# Patient Record
Sex: MALE | Race: BLACK OR AFRICAN AMERICAN | URBAN - METROPOLITAN AREA
[De-identification: names, ages, dates, MRNs, and addresses within clinical notes are randomized per-mention and may not be internally consistent; named-entity substitution may affect disease eponyms.]

---

## 2018-03-22 ENCOUNTER — HISTORICAL (OUTPATIENT)
Dept: ADMINISTRATIVE | Facility: HOSPITAL | Age: 58
End: 2018-03-22

## 2018-03-22 LAB
ABS NEUT (OLG): 2.15 X10(3)/MCL
ALBUMIN SERPL-MCNC: 2.9 GM/DL (ref 3.4–5)
ALBUMIN/GLOB SERPL: 0 RATIO (ref 1–2)
ALP SERPL-CCNC: 33 UNIT/L (ref 45–117)
ALT SERPL-CCNC: 30 UNIT/L (ref 12–78)
AMPHET UR QL SCN: NEGATIVE
APPEARANCE, UA: CLEAR
AST SERPL-CCNC: 46 UNIT/L (ref 15–37)
BACTERIA #/AREA URNS AUTO: ABNORMAL /[HPF]
BARBITURATE SCN PRESENT UR: NEGATIVE
BASOPHILS NFR BLD MANUAL: 0 %
BENZODIAZ UR QL SCN: NEGATIVE
BILIRUB SERPL-MCNC: 0.4 MG/DL (ref 0.2–1)
BILIRUB UR QL STRIP: NEGATIVE
BILIRUBIN DIRECT+TOT PNL SERPL-MCNC: 0.2 MG/DL
BILIRUBIN DIRECT+TOT PNL SERPL-MCNC: 0.2 MG/DL
BUN SERPL-MCNC: 22 MG/DL (ref 7–18)
CALCIUM SERPL-MCNC: 8.9 MG/DL (ref 8.5–10.1)
CANNABINOIDS UR QL SCN: NEGATIVE
CD3+CD4+ CELLS # SPEC: 228 UNIT/L (ref 589–1505)
CD3+CD4+ CELLS NFR BLD: 9.2 % (ref 31–59)
CHLORIDE SERPL-SCNC: 100 MMOL/L (ref 98–107)
CHOLEST SERPL-MCNC: 102 MG/DL
CHOLEST/HDLC SERPL: 2.5 {RATIO} (ref 0–5)
CO2 SERPL-SCNC: 26 MMOL/L (ref 21–32)
COCAINE UR QL SCN: NEGATIVE
COLOR UR: YELLOW
CREAT SERPL-MCNC: 1.1 MG/DL (ref 0.6–1.3)
DEPRECATED CALCIDIOL+CALCIFEROL SERPL-MC: 18.58 NG/ML (ref 30–80)
EOSINOPHIL NFR BLD MANUAL: 1 %
ERYTHROCYTE [DISTWIDTH] IN BLOOD BY AUTOMATED COUNT: 13.7 % (ref 11.5–14.5)
GLOBULIN SER-MCNC: 6.2 GM/ML (ref 2.3–3.5)
GLUCOSE (UA): NORMAL
GLUCOSE SERPL-MCNC: 71 MG/DL (ref 74–106)
GRANULOCYTES NFR BLD MANUAL: 43 % (ref 43–75)
HAV AB SER QL IA: REACTIVE
HBV CORE AB SERPL QL IA: NONREACTIVE
HBV SURFACE AB SER-ACNC: <3.1 MIU/ML
HBV SURFACE AB SERPL IA-ACNC: NONREACTIVE M[IU]/ML
HBV SURFACE AG SERPL QL IA: NEGATIVE
HCT VFR BLD AUTO: 35.4 % (ref 40–51)
HCV AB SERPL QL IA: NONREACTIVE
HDLC SERPL-MCNC: 41 MG/DL
HGB BLD-MCNC: 11.4 GM/DL (ref 13.5–17.5)
HGB UR QL STRIP: NEGATIVE
HYALINE CASTS #/AREA URNS LPF: ABNORMAL /[LPF]
KETONES UR QL STRIP: NEGATIVE
LDLC SERPL CALC-MCNC: 45 MG/DL (ref 0–130)
LEUKOCYTE ESTERASE UR QL STRIP: 25 LEU/UL
LYMPHOCYTES # BLD AUTO: 2475 UNIT/L (ref 1260–5520)
LYMPHOCYTES NFR BLD MANUAL: 2 %
LYMPHOCYTES NFR BLD MANUAL: 45 % (ref 20.5–51.1)
LYMPHOCYTES NFR LN MANUAL: 45 % (ref 28–48)
LYMPHOMA - T-CELL MARKERS SPEC-IMP: ABNORMAL
MCH RBC QN AUTO: 29.2 PG (ref 26–34)
MCHC RBC AUTO-ENTMCNC: 32.2 GM/DL (ref 31–37)
MCV RBC AUTO: 90.5 FL (ref 80–100)
MONOCYTES NFR BLD MANUAL: 6 % (ref 2–9)
NEG CONT SPOT COUNT: NORMAL
NEUTS BAND NFR BLD MANUAL: 3 % (ref 0–10)
NITRITE UR QL STRIP: NEGATIVE
OPIATES UR QL SCN: NEGATIVE
PANEL A SPOT COUNT: 0
PANEL B SPOT COUNT: 0
PCP UR QL: NEGATIVE
PH UR STRIP.AUTO: 7 [PH] (ref 5–8)
PH UR STRIP: 7 [PH] (ref 4.5–8)
PLATELET # BLD AUTO: 96 X10(3)/MCL (ref 130–400)
PLATELET # BLD EST: ABNORMAL 10*3/UL
PMV BLD AUTO: 9.9 FL (ref 7.4–10.4)
POS CONT SPOT COUNT: NORMAL
POTASSIUM SERPL-SCNC: 4.1 MMOL/L (ref 3.5–5.1)
PROT SERPL-MCNC: 9.1 GM/DL (ref 6.4–8.2)
PROT UR QL STRIP: NEGATIVE
PSA SERPL-MCNC: 4.6 NG/ML
RBC # BLD AUTO: 3.91 X10(6)/MCL (ref 4.5–5.9)
RBC #/AREA URNS AUTO: ABNORMAL /[HPF]
RBC MORPH BLD: NORMAL
SODIUM SERPL-SCNC: 134 MMOL/L (ref 136–145)
SP GR UR STRIP: 1.01 (ref 1–1.03)
SQUAMOUS #/AREA URNS LPF: ABNORMAL /[LPF]
T PALLIDUM AB SER QL: NONREACTIVE
T-SPOT.TB: NEGATIVE
T4 FREE SERPL-MCNC: 1.06 NG/DL (ref 0.76–1.46)
TEMPERATURE, URINE (OHS): 23 DEGC (ref 20–25)
TRIGL SERPL-MCNC: 78 MG/DL
TSH SERPL-ACNC: 8.58 MIU/L (ref 0.36–3.74)
UROBILINOGEN UR STRIP-ACNC: 2 MG/DL
VLDLC SERPL CALC-MCNC: 16 MG/DL
WBC # BLD AUTO: 5500 /MM3 (ref 4500–11500)
WBC # SPEC AUTO: 5.5 X10(3)/MCL (ref 4.5–11)
WBC #/AREA URNS AUTO: ABNORMAL /HPF

## 2018-05-16 LAB
COLOR STL: NORMAL
CONSISTENCY STL: NORMAL
HEMOCCULT SP1 STL QL: NEGATIVE
HEMOCCULT SP2 STL QL: NEGATIVE

## 2018-05-25 ENCOUNTER — HISTORICAL (OUTPATIENT)
Dept: ADMINISTRATIVE | Facility: HOSPITAL | Age: 58
End: 2018-05-25

## 2019-03-15 ENCOUNTER — HISTORICAL (OUTPATIENT)
Dept: ADMINISTRATIVE | Facility: HOSPITAL | Age: 59
End: 2019-03-15

## 2019-03-15 LAB
ABS NEUT (OLG): 0.58 X10(3)/MCL
ALBUMIN SERPL-MCNC: 3 GM/DL (ref 3.4–5)
ALBUMIN/GLOB SERPL: 0.6 RATIO (ref 1.1–2)
ALP SERPL-CCNC: 38 UNIT/L (ref 45–117)
ALT SERPL-CCNC: 31 UNIT/L (ref 12–78)
ANISOCYTOSIS BLD QL SMEAR: ABNORMAL
APPEARANCE, UA: CLEAR
AST SERPL-CCNC: 51 UNIT/L (ref 15–37)
BACTERIA #/AREA URNS AUTO: ABNORMAL /[HPF]
BASOPHILS NFR BLD MANUAL: 1 %
BILIRUB SERPL-MCNC: 0.4 MG/DL (ref 0.2–1)
BILIRUB UR QL STRIP: NEGATIVE
BILIRUBIN DIRECT+TOT PNL SERPL-MCNC: 0.1 MG/DL
BILIRUBIN DIRECT+TOT PNL SERPL-MCNC: 0.3 MG/DL
BUN SERPL-MCNC: 8 MG/DL (ref 7–18)
CALCIUM SERPL-MCNC: 8.6 MG/DL (ref 8.5–10.1)
CD3+CD4+ CELLS # SPEC: 69 UNIT/L (ref 589–1505)
CD3+CD4+ CELLS NFR BLD: 7.2 % (ref 31–59)
CHLORIDE SERPL-SCNC: 102 MMOL/L (ref 98–107)
CHOLEST SERPL-MCNC: 134 MG/DL
CHOLEST/HDLC SERPL: 2.1 {RATIO} (ref 0–5)
CO2 SERPL-SCNC: 29 MMOL/L (ref 21–32)
COLOR UR: YELLOW
CREAT SERPL-MCNC: 0.8 MG/DL (ref 0.6–1.3)
DEPRECATED CALCIDIOL+CALCIFEROL SERPL-MC: 13.21 NG/ML (ref 30–80)
EOSINOPHIL NFR BLD MANUAL: 0 %
ERYTHROCYTE [DISTWIDTH] IN BLOOD BY AUTOMATED COUNT: 13.4 % (ref 11.5–14.5)
GLOBULIN SER-MCNC: 5.4 GM/ML (ref 2.3–3.5)
GLUCOSE (UA): NORMAL
GLUCOSE SERPL-MCNC: 72 MG/DL (ref 74–106)
GRANULOCYTES NFR BLD MANUAL: 28 % (ref 43–75)
HAV AB SER QL IA: REACTIVE
HBV SURFACE AB SER-ACNC: <3.1 MIU/ML
HBV SURFACE AB SERPL IA-ACNC: NONREACTIVE M[IU]/ML
HBV SURFACE AG SERPL QL IA: NEGATIVE
HCT VFR BLD AUTO: 44.1 % (ref 40–51)
HCV AB SERPL QL IA: NONREACTIVE
HDLC SERPL-MCNC: 63 MG/DL
HGB BLD-MCNC: 14.1 GM/DL (ref 13.5–17.5)
HGB UR QL STRIP: NEGATIVE
HYALINE CASTS #/AREA URNS LPF: ABNORMAL /[LPF]
KETONES UR QL STRIP: NEGATIVE
LDLC SERPL CALC-MCNC: 57 MG/DL (ref 0–130)
LEUKOCYTE ESTERASE UR QL STRIP: NEGATIVE
LYMPHOCYTES # BLD AUTO: 960 UNIT/L (ref 1260–5520)
LYMPHOCYTES NFR BLD MANUAL: 4 %
LYMPHOCYTES NFR BLD MANUAL: 48 % (ref 20.5–51.1)
LYMPHOCYTES NFR LN MANUAL: 48 % (ref 28–48)
LYMPHOMA - T-CELL MARKERS SPEC-IMP: ABNORMAL
MCH RBC QN AUTO: 29.9 PG (ref 26–34)
MCHC RBC AUTO-ENTMCNC: 32 GM/DL (ref 31–37)
MCV RBC AUTO: 93.6 FL (ref 80–100)
MONOCYTES NFR BLD MANUAL: 15 % (ref 2–9)
NEG CONT SPOT COUNT: NORMAL
NEUTS BAND NFR BLD MANUAL: 4 % (ref 0–10)
NITRITE UR QL STRIP: NEGATIVE
PANEL A SPOT COUNT: 0
PANEL B SPOT COUNT: 0
PH UR STRIP: 6.5 [PH] (ref 4.5–8)
PLATELET # BLD AUTO: 28 X10(3)/MCL (ref 130–400)
PLATELET # BLD EST: ABNORMAL 10*3/UL
PMV BLD AUTO: 13.6 FL (ref 7.4–10.4)
POIKILOCYTOSIS BLD QL SMEAR: ABNORMAL
POS CONT SPOT COUNT: NORMAL
POTASSIUM SERPL-SCNC: 4.4 MMOL/L (ref 3.5–5.1)
PROT SERPL-MCNC: 8.4 GM/DL (ref 6.4–8.2)
PROT UR QL STRIP: 30 MG/DL
PSA SERPL-MCNC: 0.3 NG/ML
RBC # BLD AUTO: 4.71 X10(6)/MCL (ref 4.5–5.9)
RBC #/AREA URNS AUTO: ABNORMAL /[HPF]
RBC MORPH BLD: ABNORMAL
SODIUM SERPL-SCNC: 134 MMOL/L (ref 136–145)
SP GR UR STRIP: 1.02 (ref 1–1.03)
SQUAMOUS #/AREA URNS LPF: ABNORMAL /[LPF]
T PALLIDUM AB SER QL: NONREACTIVE
T-SPOT.TB: NORMAL
TRIGL SERPL-MCNC: 71 MG/DL
TSH SERPL-ACNC: 2.33 MIU/L (ref 0.36–3.74)
UROBILINOGEN UR STRIP-ACNC: NORMAL
VLDLC SERPL CALC-MCNC: 14 MG/DL
WBC # BLD AUTO: 2000 /MM3 (ref 4500–11500)
WBC # SPEC AUTO: 2 X10(3)/MCL (ref 4.5–11)
WBC #/AREA URNS AUTO: ABNORMAL /HPF

## 2019-04-15 ENCOUNTER — HISTORICAL (OUTPATIENT)
Dept: ADMINISTRATIVE | Facility: HOSPITAL | Age: 59
End: 2019-04-15

## 2019-04-15 LAB
ABS NEUT (OLG): 1.26 X10(3)/MCL (ref 2.1–9.2)
BASOPHILS # BLD AUTO: 0.03 X10(3)/MCL
BASOPHILS NFR BLD AUTO: 1 %
CD3+CD4+ CELLS # SPEC: 377 UNIT/L (ref 589–1505)
CD3+CD4+ CELLS NFR BLD: 16.3 % (ref 31–59)
EOSINOPHIL # BLD AUTO: 0.15 10*3/UL
EOSINOPHIL NFR BLD AUTO: 4 %
ERYTHROCYTE [DISTWIDTH] IN BLOOD BY AUTOMATED COUNT: 13.7 % (ref 11.5–14.5)
FERRITIN SERPL-MCNC: 211.6 NG/ML (ref 26–388)
HCT VFR BLD AUTO: 41 % (ref 40–51)
HGB BLD-MCNC: 13.1 GM/DL (ref 13.5–17.5)
IMM GRANULOCYTES # BLD AUTO: 0.01 10*3/UL
IMM GRANULOCYTES NFR BLD AUTO: 0 %
IRON SATN MFR SERPL: 41.5 % (ref 15–50)
IRON SERPL-MCNC: 113 MCG/DL (ref 65–175)
LYMPHOCYTES # BLD AUTO: 2.34 X10(3)/MCL
LYMPHOCYTES # BLD AUTO: 2310 UNIT/L (ref 1260–5520)
LYMPHOCYTES NFR BLD AUTO: 55 % (ref 13–40)
LYMPHOCYTES NFR LN MANUAL: 55 % (ref 28–48)
LYMPHOMA - T-CELL MARKERS SPEC-IMP: ABNORMAL
MCH RBC QN AUTO: 31.1 PG (ref 26–34)
MCHC RBC AUTO-ENTMCNC: 32 GM/DL (ref 31–37)
MCV RBC AUTO: 97.4 FL (ref 80–100)
MONOCYTES # BLD AUTO: 0.45 X10(3)/MCL
MONOCYTES NFR BLD AUTO: 11 % (ref 4–12)
NEUTROPHILS # BLD AUTO: 1.26 X10(3)/MCL
NEUTROPHILS NFR BLD AUTO: 30 X10(3)/MCL
PLATELET # BLD AUTO: 34 X10(3)/MCL (ref 130–400)
PMV BLD AUTO: ABNORMAL FL (ref 7.4–10.4)
RBC # BLD AUTO: 4.21 X10(6)/MCL (ref 4.5–5.9)
RET# (OHS): 0.06 X10(6)/MCL (ref 0.02–0.09)
RETICULOCYTE COUNT AUTOMATED (OLG): 1.4 % (ref 0.5–1.5)
TIBC SERPL-MCNC: 272 MCG/DL (ref 250–450)
TRANSFERRIN SERPL-MCNC: 225 MG/DL (ref 200–360)
WBC # BLD AUTO: 4200 /MM3 (ref 4500–11500)
WBC # SPEC AUTO: 4.2 X10(3)/MCL (ref 4.5–11)

## 2019-07-02 ENCOUNTER — HISTORICAL (OUTPATIENT)
Dept: ADMINISTRATIVE | Facility: HOSPITAL | Age: 59
End: 2019-07-02

## 2019-07-02 LAB
ABS NEUT (OLG): 1.45 X10(3)/MCL (ref 2.1–9.2)
ALBUMIN SERPL-MCNC: 3.6 GM/DL (ref 3.4–5)
ALBUMIN/GLOB SERPL: 0.8 RATIO (ref 1.1–2)
ALP SERPL-CCNC: 40 UNIT/L (ref 45–117)
ALT SERPL-CCNC: 19 UNIT/L (ref 12–78)
APTT PPP: 31.4 SECOND(S) (ref 23.3–37)
AST SERPL-CCNC: 25 UNIT/L (ref 15–37)
BASOPHILS # BLD AUTO: 0.05 X10(3)/MCL
BASOPHILS NFR BLD AUTO: 1 %
BILIRUB SERPL-MCNC: 0.4 MG/DL (ref 0.2–1)
BILIRUBIN DIRECT+TOT PNL SERPL-MCNC: 0.1 MG/DL
BILIRUBIN DIRECT+TOT PNL SERPL-MCNC: 0.3 MG/DL
BUN SERPL-MCNC: 14 MG/DL (ref 7–18)
CALCIUM SERPL-MCNC: 9.2 MG/DL (ref 8.5–10.1)
CHLORIDE SERPL-SCNC: 105 MMOL/L (ref 98–107)
CO2 SERPL-SCNC: 30 MMOL/L (ref 21–32)
CREAT SERPL-MCNC: 0.8 MG/DL (ref 0.6–1.3)
EOSINOPHIL # BLD AUTO: 0.07 10*3/UL
EOSINOPHIL NFR BLD AUTO: 2 %
ERYTHROCYTE [DISTWIDTH] IN BLOOD BY AUTOMATED COUNT: 11.8 % (ref 11.5–14.5)
GLOBULIN SER-MCNC: 4.3 GM/ML (ref 2.3–3.5)
GLUCOSE SERPL-MCNC: 85 MG/DL (ref 74–106)
HCT VFR BLD AUTO: 45.4 % (ref 40–51)
HGB BLD-MCNC: 14.4 GM/DL (ref 13.5–17.5)
IMM GRANULOCYTES # BLD AUTO: 0.01 10*3/UL
IMM GRANULOCYTES NFR BLD AUTO: 0 %
INR PPP: 0.97 (ref 0.9–1.2)
LYMPHOCYTES # BLD AUTO: 1.74 X10(3)/MCL
LYMPHOCYTES NFR BLD AUTO: 46 % (ref 13–40)
MCH RBC QN AUTO: 29.9 PG (ref 26–34)
MCHC RBC AUTO-ENTMCNC: 31.7 GM/DL (ref 31–37)
MCV RBC AUTO: 94.4 FL (ref 80–100)
MONOCYTES # BLD AUTO: 0.47 X10(3)/MCL
MONOCYTES NFR BLD AUTO: 12 % (ref 4–12)
NEUTROPHILS # BLD AUTO: 1.45 X10(3)/MCL
NEUTROPHILS NFR BLD AUTO: 38 X10(3)/MCL
PLATELET # BLD AUTO: 35 X10(3)/MCL (ref 130–400)
PMV BLD AUTO: 10.7 FL (ref 7.4–10.4)
POTASSIUM SERPL-SCNC: 4.6 MMOL/L (ref 3.5–5.1)
PROT SERPL-MCNC: 7.9 GM/DL (ref 6.4–8.2)
PROTHROMBIN TIME: 12.8 SECOND(S) (ref 11.9–14.4)
RBC # BLD AUTO: 4.81 X10(6)/MCL (ref 4.5–5.9)
SODIUM SERPL-SCNC: 136 MMOL/L (ref 136–145)
WBC # SPEC AUTO: 3.8 X10(3)/MCL (ref 4.5–11)

## 2019-07-30 ENCOUNTER — HISTORICAL (OUTPATIENT)
Dept: ADMINISTRATIVE | Facility: HOSPITAL | Age: 59
End: 2019-07-30

## 2019-07-30 LAB
ABS NEUT (OLG): 1.43 X10(3)/MCL (ref 2.1–9.2)
ALBUMIN SERPL-MCNC: 4 GM/DL (ref 3.4–5)
ALBUMIN/GLOB SERPL: 0.8 RATIO (ref 1.1–2)
ALP SERPL-CCNC: 53 UNIT/L (ref 45–117)
ALT SERPL-CCNC: 46 UNIT/L (ref 12–78)
AST SERPL-CCNC: 72 UNIT/L (ref 15–37)
BASOPHILS # BLD AUTO: 0.02 X10(3)/MCL
BASOPHILS NFR BLD AUTO: 1 %
BILIRUB SERPL-MCNC: 0.4 MG/DL (ref 0.2–1)
BILIRUBIN DIRECT+TOT PNL SERPL-MCNC: 0.1 MG/DL
BILIRUBIN DIRECT+TOT PNL SERPL-MCNC: 0.3 MG/DL
BUN SERPL-MCNC: 31 MG/DL (ref 7–18)
CALCIUM SERPL-MCNC: 9.2 MG/DL (ref 8.5–10.1)
CD3+CD4+ CELLS # SPEC: 49 UNIT/L (ref 589–1505)
CD3+CD4+ CELLS NFR BLD: 2.8 % (ref 31–59)
CHLORIDE SERPL-SCNC: 104 MMOL/L (ref 98–107)
CO2 SERPL-SCNC: 27 MMOL/L (ref 21–32)
CREAT SERPL-MCNC: 1.4 MG/DL (ref 0.6–1.3)
DEPRECATED CALCIDIOL+CALCIFEROL SERPL-MC: 95.35 NG/ML (ref 30–80)
EOSINOPHIL # BLD AUTO: 0.05 X10(3)/MCL
EOSINOPHIL NFR BLD AUTO: 1 %
ERYTHROCYTE [DISTWIDTH] IN BLOOD BY AUTOMATED COUNT: 11.7 % (ref 11.5–14.5)
GLOBULIN SER-MCNC: 5.3 GM/ML (ref 2.3–3.5)
GLUCOSE SERPL-MCNC: 93 MG/DL (ref 74–106)
HCT VFR BLD AUTO: 50.5 % (ref 40–51)
HGB BLD-MCNC: 16.1 GM/DL (ref 13.5–17.5)
IMM GRANULOCYTES # BLD AUTO: 0.01 10*3/UL
IMM GRANULOCYTES NFR BLD AUTO: 0 %
LYMPHOCYTES # BLD AUTO: 1.67 X10(3)/MCL
LYMPHOCYTES # BLD AUTO: 1748 UNIT/L (ref 1260–5520)
LYMPHOCYTES NFR BLD AUTO: 46 % (ref 13–40)
LYMPHOCYTES NFR LN MANUAL: 46 % (ref 28–48)
LYMPHOMA - T-CELL MARKERS SPEC-IMP: ABNORMAL
MCH RBC QN AUTO: 29.4 PG (ref 26–34)
MCHC RBC AUTO-ENTMCNC: 31.9 GM/DL (ref 31–37)
MCV RBC AUTO: 92.3 FL (ref 80–100)
MONOCYTES # BLD AUTO: 0.45 X10(3)/MCL
MONOCYTES NFR BLD AUTO: 12 % (ref 0–24)
NEUTROPHILS # BLD AUTO: 1.43 X10(3)/MCL
NEUTROPHILS NFR BLD AUTO: 39 X10(3)/MCL
PLATELET # BLD AUTO: 46 X10(3)/MCL (ref 130–400)
PMV BLD AUTO: 13 FL (ref 7.4–10.4)
POTASSIUM SERPL-SCNC: 4.4 MMOL/L (ref 3.5–5.1)
PROT SERPL-MCNC: 9.3 GM/DL (ref 6.4–8.2)
RBC # BLD AUTO: 5.47 X10(6)/MCL (ref 4.5–5.9)
SODIUM SERPL-SCNC: 134 MMOL/L (ref 136–145)
WBC # BLD AUTO: 3800 /MM3 (ref 4500–11500)
WBC # SPEC AUTO: 3.6 X10(3)/MCL (ref 4.5–11)

## 2019-11-19 ENCOUNTER — HISTORICAL (OUTPATIENT)
Dept: ADMINISTRATIVE | Facility: HOSPITAL | Age: 59
End: 2019-11-19

## 2019-11-19 LAB
ABS NEUT (OLG): 1.65 X10(3)/MCL (ref 2.1–9.2)
ALBUMIN SERPL-MCNC: 3.7 GM/DL (ref 3.4–5)
ALBUMIN/GLOB SERPL: 0.8 RATIO (ref 1.1–2)
ALP SERPL-CCNC: 38 UNIT/L (ref 45–117)
ALT SERPL-CCNC: 16 UNIT/L (ref 12–78)
AST SERPL-CCNC: 22 UNIT/L (ref 15–37)
BASOPHILS # BLD AUTO: 0.1 X10(3)/MCL (ref 0–0.2)
BASOPHILS NFR BLD AUTO: 2 %
BILIRUB SERPL-MCNC: 0.4 MG/DL (ref 0.2–1)
BILIRUBIN DIRECT+TOT PNL SERPL-MCNC: 0.1 MG/DL (ref 0–0.2)
BILIRUBIN DIRECT+TOT PNL SERPL-MCNC: 0.3 MG/DL
BUN SERPL-MCNC: 13 MG/DL (ref 7–18)
CALCIUM SERPL-MCNC: 9.3 MG/DL (ref 8.5–10.1)
CD3+CD4+ CELLS # SPEC: 268 UNIT/L (ref 589–1505)
CD3+CD4+ CELLS NFR BLD: 19.6 % (ref 31–59)
CHLORIDE SERPL-SCNC: 104 MMOL/L (ref 98–107)
CO2 SERPL-SCNC: 28 MMOL/L (ref 21–32)
CREAT SERPL-MCNC: 1 MG/DL (ref 0.6–1.3)
DEPRECATED CALCIDIOL+CALCIFEROL SERPL-MC: 44.62 NG/ML (ref 30–80)
EOSINOPHIL # BLD AUTO: 0.1 X10(3)/MCL (ref 0–0.9)
EOSINOPHIL NFR BLD AUTO: 2 %
ERYTHROCYTE [DISTWIDTH] IN BLOOD BY AUTOMATED COUNT: 14.6 % (ref 11.5–14.5)
GLOBULIN SER-MCNC: 4.5 GM/ML (ref 2.3–3.5)
GLUCOSE SERPL-MCNC: 80 MG/DL (ref 74–106)
HCT VFR BLD AUTO: 41.8 % (ref 40–51)
HGB BLD-MCNC: 12.8 GM/DL (ref 13.5–17.5)
IMM GRANULOCYTES # BLD AUTO: 0.01 10*3/UL
IMM GRANULOCYTES NFR BLD AUTO: 0 %
LYMPHOCYTES # BLD AUTO: 1.4 X10(3)/MCL (ref 0.6–4.6)
LYMPHOCYTES # BLD AUTO: 1369 UNIT/L (ref 1260–5520)
LYMPHOCYTES NFR BLD AUTO: 37 %
LYMPHOCYTES NFR LN MANUAL: 37 % (ref 28–48)
LYMPHOMA - T-CELL MARKERS SPEC-IMP: ABNORMAL
MCH RBC QN AUTO: 30.7 PG (ref 26–34)
MCHC RBC AUTO-ENTMCNC: 30.6 GM/DL (ref 31–37)
MCV RBC AUTO: 100.2 FL (ref 80–100)
MONOCYTES # BLD AUTO: 0.6 X10(3)/MCL (ref 0.1–1.3)
MONOCYTES NFR BLD AUTO: 15 %
NEUTROPHILS # BLD AUTO: 1.65 X10(3)/MCL (ref 2.1–9.2)
NEUTROPHILS NFR BLD AUTO: 44 %
PLATELET # BLD AUTO: ABNORMAL 10*3/UL (ref 130–400)
PMV BLD AUTO: ABNORMAL FL (ref 7.4–10.4)
POTASSIUM SERPL-SCNC: 4.2 MMOL/L (ref 3.5–5.1)
PROT SERPL-MCNC: 8.2 GM/DL (ref 6.4–8.2)
RBC # BLD AUTO: 4.17 X10(6)/MCL (ref 4.5–5.9)
SODIUM SERPL-SCNC: 137 MMOL/L (ref 136–145)
WBC # BLD AUTO: 3700 /MM3 (ref 4500–11500)
WBC # SPEC AUTO: 3.7 X10(3)/MCL (ref 4.5–11)

## 2021-04-06 ENCOUNTER — HISTORICAL (OUTPATIENT)
Dept: ADMINISTRATIVE | Facility: HOSPITAL | Age: 61
End: 2021-04-06

## 2021-04-12 LAB
FINAL CULTURE: NORMAL
FINAL CULTURE: NORMAL

## 2022-04-10 ENCOUNTER — HISTORICAL (OUTPATIENT)
Dept: ADMINISTRATIVE | Facility: HOSPITAL | Age: 62
End: 2022-04-10

## 2022-04-25 VITALS
BODY MASS INDEX: 21.22 KG/M2 | WEIGHT: 140 LBS | SYSTOLIC BLOOD PRESSURE: 124 MMHG | OXYGEN SATURATION: 99 % | DIASTOLIC BLOOD PRESSURE: 77 MMHG | HEIGHT: 68 IN

## 2022-05-03 NOTE — HISTORICAL [facility] CERNER
This is a historical note converted from Michel. Formatting and pictures may have been removed.  Please reference Michel for original formatting and attached multimedia. Chief Complaint  B20 refills needed Mepron & Biktarvy  History of Present Illness  Mr. Whalen is a 60 y/o AAM. Dx?3/2018 and started on meds 4/17/18.? He reports nonadherence to Biktarvy x 3 weeks states he ran out of meds and has had a lot of family stress that he is dealing with at present. Pts daughter is an addict and is causing him a great deal of stress.? Last HIV VL <20 and CD4 377 on 4/15/19.? Pt denies fever, headaches, visual problems, N/V/D, SOB, cough or abdominal pain. He is due for a repeat anal pap smear and is amenable. Pt is also due for a meningitis and PPSV23 today. He is amenable.? Pt reports adherence to Vit D. He is a smoker. SMokes ~ 4 cigs per day.? He is due for?a FIT test. I will order.??Pt has a hx of stuttering.? PCP is Dr. Chuck Raymundo.? ??  Review of Systems  Constitutional:negative unless stated in HPI  Eye: negative unless stated in HPI  ENMT: negative unless stated in HPI  Respiratory: negative unless stated in HPI  Cardiovascular: negative unless stated in HPI  Gastrointestinal: negative unless stated in HPI  Genitourinary: negative unless stated in HPI  Hema/Lymph: negative unless stated in HPI  Endocrine: negative unless stated in HPI  Immunologic: negative unless stated in HPI  Musculoskeletal: ambulates well without assistance  Integumentary: negative unless stated in HPI  Neurologic: negative unless stated in HPI  All Other ROS: ?AAOX3, no distress?  Physical Exam  Vitals & Measurements  T:?36.4? ?C (Oral)? HR:?64(Peripheral)? BP:?117/80?  HT:?172?cm? WT:?60.5?kg? BMI:?20.45?  Eye: PERRL, no icterus, normal conjunctivae  HENT: bilateral TM normal, normal pharynx pink, no tonsillar edema, no thrush, no sinus tenderness palpated  Neck: no LAD  Respiratory: CTA, BBS, no SOB, brisk capp refill  Cardiovascular:  RRR, s1 s2 noted,?no chest pain, no edema,  Gastrointestinal: BS + 4 quads, soft NT, ND, neg CVAT bilateral, no organomegaly  Genitourinary: neg  Lymphatics: no ?LAD  Musculoskeletal: BARILLAS well, no deformity  Integumentary: skin intact  Neurologic: CN II-IX intact???  Assessment/Plan  1.?AIDS?B20  Restart Biktarvy 1 po? q day  Medication adherence, resistance,?and sexual protection with condoms discussed in depth  RTC?6 weeks with Suzanne, labs today  Pt attended eye clinic 6/24/19  Anal pap smear 7/29/19.??  Ordered:  bictegravir/emtricitabine/tenofovir, 1 tab(s), Oral, Daily, # 30 tab(s), 3 Refill(s), Pharmacy: [facility]/pharmacy #[id number]  .Cd4 Lymphocytes, Routine collect, *Est. 07/30/19 3:00:00 CDT, Blood, Order for future visit, *Est. Stop date 07/30/19 3:00:00 CDT, Lab Collect, AIDS, 07/30/19 10:13:00 CDT  1160F- Medication reconciliation completed during visit, AIDS  Vitamin D deficiency  Stuttering  Tobacco user  Cancer screening  Encounter for immunization, [facility], 07/30/19 10:12:00 CDT  CBC w/ Auto Diff, Routine collect, *Est. 07/30/19 3:00:00 CDT, Blood, Order for future visit, *Est. Stop date 07/30/19 3:00:00 CDT, Lab Collect, AIDS, 07/30/19 10:13:00 CDT  Clinic Follow up, *Est. 09/10/19 3:00:00 CDT, Order for future visit, AIDS  Vitamin D deficiency  Stuttering  Tobacco user, [facility]  Comprehensive Metabolic Panel, Routine collect, *Est. 07/30/19 3:00:00 CDT, Blood, Order for future visit, *Est. Stop date 07/30/19 3:00:00 CDT, Lab Collect, AIDS, 07/30/19 3:00:00 CDT  Office/Outpatient Visit Level 4 Established 95860 PC, AIDS  Vitamin D deficiency  Stuttering  Tobacco user  Cancer screening  Encounter for immunization, [facility], 07/30/19 10:12:00 CDT  RNA, PCR(NonGraph)rfx/GenoPRIme(R)-[facility] [id number], Routine collect, *Est. 07/30/19 3:00:00 CDT, Blood, Order for future visit, *Est. Stop date 07/30/19 3:00:00 CDT, Lab Collect, AIDS, 07/30/19 3:00:00 CDT  Vitamin D, 25-Hydroxy Level,  Routine collect, *Est. 07/30/19 3:00:00 CDT, Blood, Order for future visit, *Est. Stop date 07/30/19 3:00:00 CDT, Lab Collect, AIDS, 07/30/19 10:13:00 CDT  ?  2.?Vitamin D deficiency?E55.9  Continue meds.? Pt educated on importance of Vit D to bone and organ health.  Ordered:  ergocalciferol, 50,000 IntUnit = 1 cap(s), Oral, QOWK, # 2 cap(s), 3 Refill(s), Pharmacy: [facility]/pharmacy #[id number]  1160F- Medication reconciliation completed during visit, AIDS  Vitamin D deficiency  Stuttering  Tobacco user  Cancer screening  Encounter for immunization, [facility], 07/30/19 10:12:00 CDT  Clinic Follow up, *Est. 09/10/19 3:00:00 CDT, Order for future visit, AIDS  Vitamin D deficiency  Stuttering  Tobacco user, [facility]  Office/Outpatient Visit Level 4 Established 71062 PC, AIDS  Vitamin D deficiency  Stuttering  Tobacco user  Cancer screening  Encounter for immunization, [facility], 07/30/19 10:12:00 CDT  ?  3.?Stuttering?F80.81  Ordered:  1160F- Medication reconciliation completed during visit, AIDS  Vitamin D deficiency  Stuttering  Tobacco user  Cancer screening  Encounter for immunization, [facility], 07/30/19 10:12:00 CDT  Clinic Follow up, *Est. 09/10/19 3:00:00 CDT, Order for future visit, AIDS  Vitamin D deficiency  Stuttering  Tobacco user, [facility]  Office/Outpatient Visit Level 4 Established 92624 PC, AIDS  Vitamin D deficiency  Stuttering  Tobacco user  Cancer screening  Encounter for immunization, [facility], 07/30/19 10:12:00 CDT  ?  4.?Tobacco user?Z72.0  Pt encouraged to stop smoking.??  Ordered:  1160F- Medication reconciliation completed during visit, AIDS  Vitamin D deficiency  Stuttering  Tobacco user  Cancer screening  Encounter for immunization, [facility], 07/30/19 10:12:00 CDT  Clinic Follow up, *Est. 09/10/19 3:00:00 CDT, Order for future visit, AIDS  Vitamin D deficiency  Stuttering  Tobacco user, [facility]  Office/Outpatient Visit Level 4  Established 77596 PC, AIDS  Vitamin D deficiency  Stuttering  Tobacco user  Cancer screening  Encounter for immunization, [facility], 07/30/19 10:12:00 CDT  ?  5.?Cancer screening?Z12.9  anal pap smear collected  Ordered:  1160F- Medication reconciliation completed during visit, AIDS  Vitamin D deficiency  Stuttering  Tobacco user  Cancer screening  Encounter for immunization, [facility], 07/30/19 10:12:00 CDT  Occult Blood Fecal Immunoassay, Routine collect, Stool, Order for future visit, *Est. 07/30/19 3:00:00 CDT, *Est. Stop date 07/30/19 3:00:00 CDT, Nurse collect, Cancer screening  Office/Outpatient Visit Level 4 Established 85453 PC, AIDS  Vitamin D deficiency  Stuttering  Tobacco user  Cancer screening  Encounter for immunization, [facility], 07/30/19 10:12:00 CDT  Pathology Non-Gyn Request [facility], *Est. 07/30/19 3:00:00 CDT, Routine, Order for future visit, AP Specimen, ANAL PAP SMEAR, ANAL CANCER SCREEN, Nurse Collect, Print Label, RT - Routine, [id number], Hold Until Collected, Cancer screening, *Est. 07/30/19 3:00:00 CDT  ?  6.?Encounter for immunization?Z23  Ordered:  meningococcal conjugate vaccine, 0.5 mL, form: Powder-Inj, IM, Once-Unscheduled, first dose 07/30/19 10:13:00 CDT  pneumococcal 23-polyvalent vaccine, 0.5 mL, form: Injection, IM, Once-Unscheduled, first dose 07/30/19 10:13:00 CDT, Waste Code [facility]  1160F- Medication reconciliation completed during visit, AIDS  Vitamin D deficiency  Stuttering  Tobacco user  Cancer screening  Encounter for immunization, [facility], 07/30/19 10:12:00 CDT  Office/Outpatient Visit Level 4 Established 17036 PC, AIDS  Vitamin D deficiency  Stuttering  Tobacco user  Cancer screening  Encounter for immunization, [facility], 07/30/19 10:12:00 CDT  ?  Referrals  Clinic Follow up, *Est. 09/10/19 3:00:00 CDT, Order for future visit, AIDS  Vitamin D deficiency  Stuttering  Tobacco user, [facility]   Problem List/Past Medical  History  Ongoing  Stuttering  Tobacco user  Historical  Edema of lower extremity  HIV  HTN - Hypertension  Procedure/Surgical History  artery repair  stent in right arm   Medications  acetaminophen-oxycodone 325 mg-10 mg oral tablet, 1 tab(s), Oral, BID  Biktarvy oral tablet, 1 tab(s), Oral, Daily, 3 refills  ergocalciferol 50,000 intl units (1.25 mg) oral capsule, 85262 IntUnit= 1 cap(s), Oral, QOWK, 3 refills  hydrochlorothiazide 25 mg oral tablet, 25 mg= 1 tab(s), Oral, Daily  latanoprost 0.005% ophthalmic solution, 1 drop(s), Eye-Right, qPM, 6 refills  losartan 100 mg oral tablet, 100 mg= 1 tab(s), Oral, Daily  meningococcal conjugate vaccine intramuscular injection, 0.5 mL, IM, Once-Unscheduled  Pneumovax 23, 0.5 mL, IM, Once-Unscheduled  Allergies  No Known Allergies  Social History  Abuse/Neglect  No, 07/30/2019  Alcohol  Past, Beer, 1-2 times per year, Started age 13 Years. Alcohol use interferes with work or home: No. Drinks more than intended: Yes. Others hurt by drinking: No. Ready to change: Yes. Household alcohol concerns: No., 04/30/2019  Employment/School  Disabled, Highest education level: High school., 04/17/2018  Exercise  Exercise frequency: 1-2 times/week. Exercise type: Walking., 04/17/2018  Home/Environment  Lives with Father, Mother., 04/17/2018  Nutrition/Health  Low salt diet, 04/17/2018  Sexual  Gender Identity Identifies as male., 06/24/2019  Sexually active: No., 04/17/2018  Substance Use  Never, 07/28/2017  Tobacco  10 or more cigarettes (1/2 pack or more)/day in last 30 days, No, 07/30/2019  Family History  Diabetes mellitus type 2: Mother.  Hypertension.: Mother, Sister and Brother.  Immunizations  Vaccine Date Status   hepatitis A adult vaccine 04/17/2019 Recorded   pneumococcal 13-valent conjugate vaccine 04/17/2018 Given   hepatitis B adult vaccine 04/17/2018 Given   Health Maintenance  Health Maintenance  ???Pending?(in the next year)  ??? ??OverDue  ??? ? ? ?Smoking Cessation  due??01/01/19??and every 1??year(s)  ??? ? ? ?Colorectal Screening due??05/16/19??and every 1??year(s)  ??? ??Due?  ??? ? ? ?Aspirin Therapy for CVD Prevention due??07/30/19??and every 1??year(s)  ??? ? ? ?Lung Cancer Screening due??07/30/19??and every 1??year(s)  ??? ? ? ?Tetanus Vaccine due??07/30/19??and every 10??year(s)  ??? ??Due In Future?  ??? ? ? ?Alcohol Misuse Screening not due until??01/01/20??and every 1??year(s)  ??? ? ? ?Obesity Screening not due until??01/01/20??and every 1??year(s)  ??? ? ? ?ADL Screening not due until??03/15/20??and every 1??year(s)  ??? ? ? ?Depression Screening not due until??04/29/20??and every 1??year(s)  ??? ? ? ?Hypertension Management-BMP not due until??07/01/20??and every 1??year(s)  ??? ? ? ?Blood Pressure Screening not due until??07/29/20??and every 1??year(s)  ??? ? ? ?Body Mass Index Check not due until??07/29/20??and every 1??year(s)  ??? ? ? ?Hypertension Management-Blood Pressure not due until??07/29/20??and every 1??year(s)  ???Satisfied?(in the past 1 year)  ??? ??Satisfied?  ??? ? ? ?ADL Screening on??03/15/19.??Satisfied by Rosa Oropeza LPN  ??? ? ? ?Alcohol Misuse Screening on??03/15/19.??Satisfied by Suzanne Erazo NP  ??? ? ? ?Blood Pressure Screening on??07/30/19.??Satisfied by Swapnil Pimentel LPN  ??? ? ? ?Body Mass Index Check on??07/30/19.??Satisfied by Swapnil Pimentel LPN  ??? ? ? ?Depression Screening on??04/30/19.??Satisfied by Chuy Wang LPN  ??? ? ? ?Diabetes Screening on??07/02/19.??Satisfied by Shirley Navarro  ??? ? ? ?Hypertension Management-Blood Pressure on??07/30/19.??Satisfied by Swapnil Pimentel LPN  ??? ? ? ?Lipid Screening on??03/15/19.??Satisfied by Rachael Hendricks  ??? ? ? ?Obesity Screening on??07/30/19.??Satisfied by Swapnil Pimentel LPN  ?      ?  Rash noted to buttocks on exam, demarcated and pruritic extending from buttocks to groin,?  Ketoconazole rx sent.

## 2022-05-03 NOTE — HISTORICAL [facility]ER
This is a historical note converted from Michel. Formatting and pictures may have been removed.  Please reference [facility] for original formatting and attached multimedia. Chief Complaint  b20 f/u  History of Present Illness  Mr. Whalen is a 57 y/o  male returning to care.? He was dx 3/2018 and started on meds 4/17/18.? Pt was started on Biktarvy, but never returned for f/u appts. He states he took it daily until 1-2 months ago when he stopped bc he felt like it was causing a pain in his stomach every time he took it.? Pt presents today with oropharyngeal thrush that has been present for 2-3 weeks.?He states he has been using warm salt water and lidocaine.? Pt will need updated labs today.? Pt denies fever, headaches, visual problems, N/V/D, SOB, cough or abdominal pain. He is due for an eye exam. I will refer.? Pt is a smoker. He smokes approx 4 cigs per day.? Pt has a hx of stuttering.?  ?   4/17/18  56 y/o BM HIV patient here for initial visit. Pt [place] [facility] [place].?Thrush has?since?resolved with Diflucan. Pt?believes he contracted HIV from a toothbrush.? He states he only has sex with woman and not that many.? I explained that anyone patient has ever been in contact with?could have been his HIV?source.?Pt states?he has no idea how he contracted?HIV, but he is very eager to start treatment.? Pt denies fever, headaches, visual problems, N/V/D, SOB, cough or abdominal pain. Vit D level is low so patient will need to start tx with Vit D. he is due for anal pap smear and STI screening. Pt is amenable to having that procedure done today.? He is also due for PCV13 and Hep B vaccination series which? I plan to start today as patient is amenable.? Pt is a smoker states he smokes 3 cigs per day since age 13.?He has a hx of problems with stuttering.? [1]  Review of Systems  Constitutional:negative unless stated in HPI  Eye: negative unless stated in HPI  ENMT:  negative unless stated in HPI  Respiratory: negative unless stated in HPI  Cardiovascular: negative unless stated in HPI  Gastrointestinal: negative unless stated in HPI  Genitourinary: negative unless stated in HPI  Hema/Lymph: negative unless stated in HPI  Endocrine: negative unless stated in HPI  Immunologic: negative unless stated in HPI  Musculoskeletal: ambulates well without assistance  Integumentary: negative unless stated in HPI  Neurologic: negative unless stated in HPI  All Other ROS: ?AAOX3, no distress?  Physical Exam  Vitals & Measurements  T:?36.4? ?C (Oral)? HR:?63(Peripheral)? RR:?18? BP:?116/77? SpO2:?100%?  HT:?170?cm? WT:?57.9?kg? BMI:?20.03?  Eye: PERRL, no icterus, normal conjunctivae  HENT: bilateral TM normal, normal pharynx pink, no tonsillar edema, thrush noted to tongue, buccal area and oropharyngeal area, no sinus tenderness palpated  Neck: no LAD  Respiratory: CTA, BBS, no SOB, brisk capp refill  Cardiovascular: RRR, s1 s2 noted,?no chest pain, no edema,  Gastrointestinal: BS + 4 quads, soft NT, ND, neg CVAT bilateral, no organmegaly  Genitourinary: neg  Lymphatics: no ?LAD  Musculoskeletal: BARILLAS well, no deformity  Integumentary: skin intact  Neurologic: CN II-IX intact???  Assessment/Plan  1.?HIV disease?B20  Restart Biktarvy 1 po? q day and Bactrim DS 1 po q day for OI prophy  Medication adherence, resistance,?and sexual protection with condoms discussed in depth  RTC?6 weeks with Suzanne labs 1 today  Refer to eye clinic for evaluation.  Anal pap smear and STD screening done 4/17/18  Ordered:  bictegravir/emtricitabine/tenofovir, 1 tab(s), Oral, Daily, # 30 tab(s), 1 Refill(s), Pharmacy: [id number]  sulfamethoxazole-trimethoprim, 1 tab(s), Oral, Daily, # 30 tab(s), 1 Refill(s), Pharmacy: [id number]  .Cd4 Lymphocytes, Routine collect, 03/15/19 8:30:00 CDT, Blood, Order for future visit, Stop date 03/15/19 8:30:00 CDT, Lab Collect, HIV disease, 03/15/19  8:30:00 CDT  1160F- Medication reconciliation completed during visit, HIV disease  Vitamin D deficiency  Oropharyngeal candidiasis  Stuttering  Tobacco user, [facility], 03/15/19 8:30:00 CDT  CBC w/ Auto Diff, Routine collect, 03/15/19 8:30:00 CDT, Blood, Order for future visit, Stop date 03/15/19 8:30:00 CDT, Lab Collect, HIV disease, 03/15/19 8:30:00 CDT  Chlamydia trach and N. gonorrhea PCR, Routine collect, Urine, Order for future visit, 03/15/19 8:30:00 CDT, Stop date 03/15/19 8:30:00 CDT, Nurse collect, HIV disease  Clinic Follow up, *Est. 04/26/19 3:00:00 CDT, Order for future visit, HIV disease  Vitamin D deficiency  Oropharyngeal candidiasis  Stuttering  Tobacco user, [facility]  Comprehensive Metabolic Panel, Routine collect, 03/15/19 8:30:00 CDT, Blood, Order for future visit, Stop date 03/15/19 8:30:00 CDT, Lab Collect, HIV disease, 03/15/19 8:30:00 CDT  Glucose-6-Phosphate Dehydrogenase-[facility] [id number], Routine collect, 03/15/19 8:30:00 CDT, Blood, Order for future visit, Stop date 03/15/19 8:30:00 CDT, Lab Collect, HIV disease, 03/15/19 8:30:00 CDT  Hepatitis A Antibody Total, Routine collect, 03/15/19 8:30:00 CDT, Blood, Order for future visit, Stop date 03/15/19 8:30:00 CDT, Lab Collect, HIV disease, 03/15/19 8:30:00 CDT  Hepatitis B Surface Antibody, Routine collect, 03/15/19 8:30:00 CDT, Blood, Order for future visit, Stop date 03/15/19 8:30:00 CDT, Lab Collect, HIV disease, 03/15/19 8:30:00 CDT  Hepatitis B Surface Antigen, Routine collect, 03/15/19 8:30:00 CDT, Blood, Order for future visit, Stop date 03/15/19 8:30:00 CDT, Lab Collect, HIV disease, 03/15/19 8:30:00 CDT  Hepatitis C Antibody, Routine collect, 03/15/19 8:30:00 CDT, Blood, Order for future visit, Stop date 03/15/19 8:30:00 CDT, Lab Collect, HIV disease, 03/15/19 8:30:00 CDT  Internal Referral to [facility], HIV FUNDUS PHOTO, *Est. 03/15/19 3:00:00 CDT, Future Visit?, HIV disease  Lipid Panel, Routine  collect, 03/15/19 8:30:00 CDT, Blood, Order for future visit, Stop date 03/15/19 8:30:00 CDT, Lab Collect, HIV disease, 03/15/19 8:30:00 CDT  Office/Outpatient Visit Level 4 Established 81273 PC, HIV disease  Vitamin D deficiency  Oropharyngeal candidiasis  Stuttering  Tobacco user, [facility], 03/15/19 8:30:00 CDT  RNA, PCR(NonGraph)rfx/GenoPRIme(R)-[facility] [id number], Routine collect, 03/15/19 8:30:00 CDT, Blood, Order for future visit, Stop date 03/15/19 8:30:00 CDT, Lab Collect, HIV disease, 03/15/19 8:30:00 CDT  Syphilis Antibody (with Reflex RPR), Routine collect, 03/15/19 8:30:00 CDT, Blood, Order for future visit, Stop date 03/15/19 8:30:00 CDT, Lab Collect, HIV disease, 03/15/19 8:30:00 CDT  T Spot Test for M. tuberculosis, Routine collect, 03/15/19 8:30:00 CDT, Blood, Order for future visit, Stop date 03/15/19 8:30:00 CDT, Lab Collect, HIV disease, 03/15/19 8:30:00 CDT  Thyroid Stimulating Hormone, Now collect, 03/15/19 8:30:00 CDT, Blood, Order for future visit, Stop date 03/15/19 8:30:00 CDT, Lab Collect, HIV disease, 03/15/19 8:30:00 CDT  Urinalysis with Microscopic if Indicated, Routine collect, Urine, Order for future visit, 03/15/19 8:30:00 CDT, Stop date 03/15/19 8:30:00 CDT, Nurse collect, HIV disease  Vitamin D, 25-Hydroxy Level, Routine collect, 03/15/19 8:30:00 CDT, Blood, Order for future visit, Stop date 03/15/19 8:30:00 CDT, Lab Collect, HIV disease, 03/15/19 8:30:00 CDT  ?  2.?Vitamin D deficiency?E55.9  Continue meds.? Pt educated on importance of Vit D to bone and organ health.  Ordered:  ergocalciferol, 50,000 IntUnit = 1 cap(s), Oral, qWeek, take once weekly x 8 weeks, then every other week thereafter, # 4 cap(s), 2 Refill(s), Pharmacy: [facility] [id number] 6040M- Medication reconciliation completed during visit, HIV disease  Vitamin D deficiency  Oropharyngeal candidiasis  Stuttering  Tobacco user, [facility], 03/15/19 8:30:00 CDT  Clinic Follow up, *Est. 04/26/19 3:00:00  CDT, Order for future visit, HIV disease  Vitamin D deficiency  Oropharyngeal candidiasis  Stuttering  Tobacco user, [facility]  Office/Outpatient Visit Level 4 Established 98282 PC, HIV disease  Vitamin D deficiency  Oropharyngeal candidiasis  Stuttering  Tobacco user, [facility], 03/15/19 8:30:00 CDT  ?  3.?Oropharyngeal candidiasis?B37.0  Start Fluconazole.  Ordered:  fluconazole, 200 mg = 1 tab(s), Oral, Daily, X 21 day(s), # 21 tab(s), 0 Refill(s), Pharmacy: [facility] [id number]  1160F- Medication reconciliation completed during visit, HIV disease  Vitamin D deficiency  Oropharyngeal candidiasis  Stuttering  Tobacco user, [facility], 03/15/19 8:30:00 CDT  Clinic Follow up, *Est. 04/26/19 3:00:00 CDT, Order for future visit, HIV disease  Vitamin D deficiency  Oropharyngeal candidiasis  Stuttering  Tobacco user, [facility]  Office/Outpatient Visit Level 4 Established 27999 PC, HIV disease  Vitamin D deficiency  Oropharyngeal candidiasis  Stuttering  Tobacco user, [facility], 03/15/19 8:30:00 CDT  ?  4.?Stuttering?F80.81  Ordered:  1160F- Medication reconciliation completed during visit, HIV disease  Vitamin D deficiency  Oropharyngeal candidiasis  Stuttering  Tobacco user, [facility], 03/15/19 8:30:00 CDT  Clinic Follow up, *Est. 04/26/19 3:00:00 CDT, Order for future visit, HIV disease  Vitamin D deficiency  Oropharyngeal candidiasis  Stuttering  Tobacco user, [facility]  Office/Outpatient Visit Level 4 Established 29661 PC, HIV disease  Vitamin D deficiency  Oropharyngeal candidiasis  Stuttering  Tobacco user, [facility], 03/15/19 8:30:00 CDT  ?  5.?Tobacco user?Z72.0  Pt encouraged to stop smoking.??  Ordered:  1160F- Medication reconciliation completed during visit, HIV disease  Vitamin D deficiency  Oropharyngeal candidiasis  Stuttering  Tobacco user, [facility], 03/15/19 8:30:00 CDT  Clinic Follow up, *Est. 04/26/19 3:00:00 CDT, Order for  future visit, HIV disease  Vitamin D deficiency  Oropharyngeal candidiasis  Stuttering  Tobacco user, [facility]  Office/Outpatient Visit Level 4 Established 37392 PC, HIV disease  Vitamin D deficiency  Oropharyngeal candidiasis  Stuttering  Tobacco user, [facility], 03/15/19 8:30:00 CDT  ?  Orders:  Prostate Specific Antigen, Routine collect, 03/15/19 8:30:00 CDT, Blood, Order for future visit, Stop date 03/15/19 8:30:00 CDT, Lab Collect, Cancer screening, 03/15/19 8:30:00 CDT   Problem List/Past Medical History  Ongoing  Stuttering  Tobacco user  Historical  Edema of lower extremity  HTN - Hypertension  Procedure/Surgical History  artery repair  stent in right arm   Medications  acetaminophen-oxycodone 325 mg-10 mg oral tablet, 1 tab(s), Oral, BID  Bactrim  mg-160 mg oral tablet, 1 tab(s), Oral, Daily, 1 refills  Biktarvy oral tablet, 1 tab(s), Oral, Daily, 1 refills  Diflucan 200 mg oral tablet, 200 mg= 1 tab(s), Oral, Daily  ergocalciferol 50,000 intl units (1.25 mg) oral capsule, 07073 IntUnit= 1 cap(s), Oral, qWeek, 2 refills  GI Cocktail - [facility], 30 mL, Oral, Now  hydrochlorothiazide 25 mg oral tablet, 25 mg= 1 tab(s), Oral, Daily  lidocaine 2% mucous membrane sol  losartan 100 mg oral tablet, 100 mg= 1 tab(s), Oral, Daily  MEGESTROL AC RODRIGO 40MG/ML  Allergies  No Known Allergies  Social History  Alcohol  Past, Beer, Started age 13 Years. Alcohol use interferes with work or home: No. Drinks more than intended: Yes. Others hurt by drinking: No. Ready to change: Yes. Household alcohol concerns: No., 04/17/2018  Employment/School  Disabled, Highest education level: High school., 04/17/2018  Exercise  Exercise frequency: 1-2 times/week. Exercise type: Walking., 04/17/2018  Home/Environment  Lives with Father, Mother., 04/17/2018  Nutrition/Health  Low salt diet, 04/17/2018  Sexual  Sexually active: No., 04/17/2018  Substance Abuse  Never, 07/28/2017  Tobacco  4 or less cigarettes(less than 1/4  pack)/day in last 30 days, N/A, 03/15/2019  Family History  Diabetes mellitus type 2: Mother.  Hypertension.: Mother, Sister and Brother.  Immunizations  Vaccine Date Status   pneumococcal 13-valent conjugate vaccine 04/17/2018 Given   hepatitis B adult vaccine 04/17/2018 Given   Health Maintenance  Health Maintenance  ???Pending?(in the next year)  ??? ??OverDue  ??? ? ? ?Diabetes Screening due??and every?  ??? ??Due?  ??? ? ? ?Aspirin Therapy for CVD Prevention due??03/15/19??and every 1??year(s)  ??? ? ? ?Lung Cancer Screening due??03/15/19??and every 1??year(s)  ??? ? ? ?Tetanus Vaccine due??03/15/19??and every 10??year(s)  ??? ??Due In Future?  ??? ? ? ?Hypertension Management-BMP not due until??03/22/19??and every 1??year(s)  ??? ? ? ?Smoking Cessation not due until??04/17/19??and every 1??year(s)  ??? ? ? ?Colorectal Screening not due until??05/16/19??and every 1??year(s)  ??? ? ? ?Blood Pressure Screening not due until??03/14/20??and every 1??year(s)  ??? ? ? ?Body Mass Index Check not due until??03/14/20??and every 1??year(s)  ??? ? ? ?Depression Screening not due until??03/14/20??and every 1??year(s)  ??? ? ? ?Hypertension Management-Blood Pressure not due until??03/14/20??and every 1??year(s)  ???Satisfied?(in the past 1 year)  ??? ??Satisfied?  ??? ? ? ?ADL Screening on??03/15/19.??Satisfied by Rosa Oropeza LPN  ??? ? ? ?Alcohol Misuse Screening on??03/15/19.??Satisfied by Suzanne Erazo NP  ??? ? ? ?Blood Pressure Screening on??03/15/19.??Satisfied by Rosa Oropeza LPN  ??? ? ? ?Body Mass Index Check on??03/15/19.??Satisfied by Rosa Oropeza LPN  ??? ? ? ?Colorectal Screening on??05/16/18.??Satisfied by Lizzy Delacruz  ??? ? ? ?Depression Screening on??03/15/19.??Satisfied by Rosa Oropeza LPN  ??? ? ? ?Diabetes Screening on??03/22/18.??Satisfied by James Le  ??? ? ? ?Hypertension Management-Blood Pressure on??03/15/19.??Satisfied by Rosa Oropeza LPN  ??? ? ? ?Lipid Screening  on??03/22/18.??Satisfied by James Le  ??? ? ? ?Obesity Screening on??03/15/19.??Satisfied by Rosa Oropeza LPN  ??? ? ? ?Smoking Cessation on??04/17/18.??Satisfied by Rona Jaime LPN  ??? ? ? ?Smoking Cessation (Coronary Artery Disease) on??04/17/18.??Satisfied by Rona Jaime LPN  ?  ?     [1]?Office Visit Note; Suzanne Erazo NP 04/17/2018 11:56 CDT  [2]?Office Visit Note; Suzanne Erazo NP 04/17/2018 11:56 CDT

## 2022-05-03 NOTE — HISTORICAL [facility] CERNER
This is a historical note converted from Michel. Formatting and pictures may have been removed.  Please reference Michel for original formatting and attached multimedia. Chief Complaint  F/U B20 mgmt.  History of Present Illness  Mr. Whalen is a 60 y/o AAM. Dx?3/2018 and started on meds 4/17/18.? Pt reports that he has been taking his?Biktarvy once daily until he ran out about 2-3 days.? Hr reports nonadherence to Bactrim and Zithromax.??Last HIV ,000 and?CD4 49 on 7/30/19.??Pt denies fever, headaches, visual problems, N/V/D, SOB, cough or abdominal pain. Last anal pap smear showed ASCUS on 8/8/19.? This will need to be repeated 2/20.??Pt reports nonadherence to Vit D. Pt is a smoker states he smokes ~ 4 cigs per day.??He is due for?a FIT and bone density test. I will order.??Pt has a hx of stuttering.? PCP is Dr. Chuck Raymundo. HIV fundus photo?up to date.?Pt states he has been stressed out dealing with his daughter who is an addict.? ?????  Review of Systems  Constitutional:negative unless stated in HPI  Eye: negative unless stated in HPI  ENMT: negative unless stated in HPI  Respiratory: negative unless stated in HPI  Cardiovascular: negative unless stated in HPI  Gastrointestinal: negative unless stated in HPI  Genitourinary: negative unless stated in HPI  Hema/Lymph: negative unless stated in HPI  Endocrine: negative unless stated in HPI  Immunologic: negative unless stated in HPI  Musculoskeletal: ambulates well without assistance  Integumentary: negative unless stated in HPI  Neurologic: negative unless stated in HPI  All Other ROS: ?AAOX3, no distress?  Physical Exam  Vitals & Measurements  T:?36.7? ?C (Oral)? HR:?80(Peripheral)? RR:?16? BP:?124/77?  HT:?172?cm? WT:?63.5?kg? BMI:?21.46?  Eye: PERRL, no icterus, normal conjunctivae  HENT: bilateral TM normal, normal pharynx pink, no tonsillar edema, no thrush, no sinus tenderness palpated  Neck: no LAD  Respiratory: CTA, BBS, no SOB, brisk capp  refill  Cardiovascular: RRR, s1 s2 noted,?no chest pain, no edema,  Gastrointestinal: BS + 4 quads, soft NT, ND, neg CVAT bilateral, no organomegaly  Genitourinary: neg  Lymphatics: no ?LAD  Musculoskeletal: BARILLAS well, no deformity  Integumentary: skin intact  Neurologic: CN II-IX intact???  Assessment/Plan  1.?AIDS?B20  Restart Biktarvy 1 po? q day  Medication adherence, resistance,?and sexual protection with condoms discussed in depth  RTC?6 weeks with Suzanne, labs today  Pt attended eye clinic 6/24/19  Anal pap smear 7/29/19.??  Ordered:  azithromycin, 1,200 mg = 2 tab(s), Oral, qWeek, # 8 tab(s), 2 Refill(s), Pharmacy: [facility]/pharmacy #[id number]  bictegravir/emtricitabine/tenofovir, 1 tab(s), Oral, Daily, # 30 tab(s), 2 Refill(s), Pharmacy: [facility]/pharmacy #[id number]  sulfamethoxazole-trimethoprim, 1 tab(s), Oral, Daily, # 30 tab(s), 2 Refill(s), Pharmacy: [facility]/pharmacy #[id number]  .Cd4 Lymphocytes, Routine collect, *Est. 11/19/19 3:00:00 CST, Blood, Order for future visit, *Est. Stop date 11/19/19 3:00:00 CST, Lab Collect, AIDS, 11/19/19 14:01:00 CST  1160F- Medication reconciliation completed during visit, AIDS  Vitamin D deficiency  Stuttering  Tobacco user, [facility], 11/19/19 14:01:00 CST  CBC w/ Auto Diff, Routine collect, *Est. 11/19/19 3:00:00 CST, Blood, Order for future visit, *Est. Stop date 11/19/19 3:00:00 CST, Lab Collect, AIDS, 11/19/19 14:01:00 CST  Clinic Follow up, *Est. 12/31/19 3:00:00 CST, Order for future visit, AIDS  Vitamin D deficiency  Stuttering  Tobacco user, [facility]  Comprehensive Metabolic Panel, Routine collect, *Est. 11/19/19 3:00:00 CST, Blood, Order for future visit, *Est. Stop date 11/19/19 3:00:00 CST, Lab Collect, AIDS, 11/19/19 3:00:00 CST  Office/Outpatient Visit Level 4 Established 48035 PC, AIDS  Vitamin D deficiency  Stuttering  Tobacco user, [facility], 11/19/19 14:01:00 CST  RNA, PCR(NonGraph)rfx/GenoPRIme(R)-[facility] [id number], Routine collect, *Est. 11/19/19 3:00:00 CST,  Blood, Order for future visit, *Est. Stop date 11/19/19 3:00:00 CST, Lab Collect, AIDS, 11/19/19 3:00:00 CST  Vitamin D, 25-Hydroxy Level, Routine collect, *Est. 11/19/19 3:00:00 CST, Blood, Order for future visit, *Est. Stop date 11/19/19 3:00:00 CST, Lab Collect, AIDS, 11/19/19 14:01:00 CST  XR Bone Density Complete, Routine, *Est. 11/19/19 3:00:00 CST, Screening, None, Ambulatory, Rad Type, Order for future visit, Long-term use of high-risk medication  Vitamin D deficiency  AIDS, Schedule this test, [facility], *Est. 11/19/19 3:00:00 CST  ?  2.?Vitamin D deficiency?E55.9  Continue meds.? Pt educated on importance of Vit D to bone and organ health.  Ordered:  ergocalciferol, 50,000 IntUnit = 1 cap(s), Oral, QOWK, # 2 cap(s), 3 Refill(s), Pharmacy: [facility]/pharmacy #[id number]  1160F- Medication reconciliation completed during visit, AIDS  Vitamin D deficiency  Stuttering  Tobacco user, [facility], 11/19/19 14:01:00 CST  Clinic Follow up, *Est. 12/31/19 3:00:00 CST, Order for future visit, AIDS  Vitamin D deficiency  Stuttering  Tobacco user, [facility]  Office/Outpatient Visit Level 4 Established 78629 PC, AIDS  Vitamin D deficiency  Stuttering  Tobacco user, [facility], 11/19/19 14:01:00 CST  XR Bone Density Complete, Routine, *Est. 11/19/19 3:00:00 CST, Screening, None, Ambulatory, Rad Type, Order for future visit, Long-term use of high-risk medication  Vitamin D deficiency  AIDS, Schedule this test, [facility], *Est. 11/19/19 3:00:00 CST  ?  3.?Stuttering?F80.81  Ordered:  1160F- Medication reconciliation completed during visit, AIDS  Vitamin D deficiency  Stuttering  Tobacco user, [facility], 11/19/19 14:01:00 CST  Clinic Follow up, *Est. 12/31/19 3:00:00 CST, Order for future visit, AIDS  Vitamin D deficiency  Stuttering  Tobacco user, [facility]  Office/Outpatient Visit Level 4 Established 80394 PC, AIDS  Vitamin D deficiency  Stuttering   Tobacco user, [facility], 11/19/19 14:01:00 CST  ?  4.?Tobacco user?Z72.0  Pt encouraged to stop smoking.??  Ordered:  1160F- Medication reconciliation completed during visit, AIDS  Vitamin D deficiency  Stuttering  Tobacco user, [facility], 11/19/19 14:01:00 CST  Clinic Follow up, *Est. 12/31/19 3:00:00 CST, Order for future visit, AIDS  Vitamin D deficiency  Stuttering  Tobacco user, [facility]  Office/Outpatient Visit Level 4 Established 14633 PC, AIDS  Vitamin D deficiency  Stuttering  Tobacco user, [facility], 11/19/19 14:01:00 CST  ?  5.?Long-term use of high-risk medication?Z79.899  Ordered:  XR Bone Density Complete, Routine, *Est. 11/19/19 3:00:00 CST, Screening, None, Ambulatory, Rad Type, Order for future visit, Long-term use of high-risk medication  Vitamin D deficiency  AIDS, Schedule this test, [facility], *Est. 11/19/19 3:00:00 CST  ?  6.?Screening for cancer?Z12.9  FIT test ordered  Ordered:  Occult Blood Fecal Immunoassay, Routine collect, Stool, Order for future visit, *Est. 11/19/19 3:00:00 CST, *Est. Stop date 11/19/19 3:00:00 CST, Nurse collect, Screening for cancer  ?  7. ASCUS  anal pap will need to be repeated 2/2020  [facility]  Clinic Follow up, *Est. 12/31/19 3:00:00 CST, Order for future visit, AIDS  Vitamin D deficiency  Stuttering  Tobacco user, [facility]   Problem List/Past Medical History  Ongoing  Stuttering  Tobacco user  Historical  Edema of lower extremity  HIV  HTN - Hypertension  Procedure/Surgical History  artery repair  stent in right arm   Medications  Biktarvy oral tablet, 1 tab(s), Oral, Daily, 2 refills  ergocalciferol 50,000 intl units (1.25 mg) oral capsule, 52628 IntUnit= 1 cap(s), Oral, QOWK, 3 refills  hydrochlorothiazide 25 mg oral tablet, 25 mg= 1 tab(s), Oral, Daily  latanoprost 0.005% ophthalmic solution, 1 drop(s), Eye-Right, qPM, 6 refills  losartan 100 mg oral tablet, 100 mg= 1 tab(s), Oral,  Daily  sulfamethoxazole-trimethoprim 800 mg-160 mg oral tablet, 1 tab(s), Oral, Daily, 2 refills  Zithromax 600 mg oral tablet, 1200 mg= 2 tab(s), Oral, qWeek, 2 refills  Allergies  No Known Allergies  Social History  Abuse/Neglect  No, 07/30/2019  Alcohol  Past, Beer, 1-2 times per year, Started age 13 Years. Alcohol use interferes with work or home: No. Drinks more than intended: Yes. Others hurt by drinking: No. Ready to change: Yes. Household alcohol concerns: No., 04/30/2019  Employment/School  Disabled, Highest education level: High school., 04/17/2018  Exercise  Exercise frequency: 1-2 times/week. Exercise type: Walking., 04/17/2018  Home/Environment  Lives with Father, Mother., 04/17/2018  Nutrition/Health  Low salt diet, 04/17/2018  Sexual  Gender Identity Identifies as male., 06/24/2019  Sexually active: No., 04/17/2018  Substance Use  Never, 07/28/2017  Tobacco  4 or less cigarettes(less than 1/4 pack)/day in last 30 days, Cigarettes, No, 4 per day., 11/19/2019  Family History  Diabetes mellitus type 2: Mother.  Hypertension.: Mother, Sister and Brother.  Immunizations  Vaccine Date Status   pneumococcal 23-polyvalent vaccine 07/30/2019 Given   meningococcal conjugate vaccine 07/30/2019 Given   hepatitis A adult vaccine 04/17/2019 Recorded   pneumococcal 13-valent conjugate vaccine 04/17/2018 Given   hepatitis B adult vaccine 04/17/2018 Given   Health Maintenance  Health Maintenance  ???Pending?(in the next year)  ??? ??OverDue  ??? ? ? ?Smoking Cessation due??01/01/19??and every 1??year(s)  ??? ? ? ?Colorectal Screening due??05/16/19??and every 1??year(s)  ??? ??Due?  ??? ? ? ?Aspirin Therapy for CVD Prevention due??11/19/19??and every 1??year(s)  ??? ? ? ?Influenza Vaccine due??11/19/19??and every?  ??? ? ? ?Lung Cancer Screening due??11/19/19??and every 1??year(s)  ??? ? ? ?Tetanus Vaccine due??11/19/19??and every 10??year(s)  ??? ??Due In Future?  ??? ? ? ?Alcohol Misuse Screening not due  until??01/01/20??and every 1??year(s)  ??? ? ? ?Obesity Screening not due until??01/01/20??and every 1??year(s)  ??? ? ? ?ADL Screening not due until??03/15/20??and every 1??year(s)  ??? ? ? ?Hypertension Management-BMP not due until??09/19/20??and every 1??year(s)  ??? ? ? ?Blood Pressure Screening not due until??11/18/20??and every 1??year(s)  ??? ? ? ?Body Mass Index Check not due until??11/18/20??and every 1??year(s)  ??? ? ? ?Depression Screening not due until??11/18/20??and every 1??year(s)  ??? ? ? ?Hypertension Management-Blood Pressure not due until??11/18/20??and every 1??year(s)  ???Satisfied?(in the past 1 year)  ??? ??Satisfied?  ??? ? ? ?ADL Screening on??03/15/19.??Satisfied by Rosa Oropeza LPN  ??? ? ? ?Alcohol Misuse Screening on??03/15/19.??Satisfied by Suzanne Erazo NP  ??? ? ? ?Blood Pressure Screening on??11/19/19.??Satisfied by Hong Briscoe  ??? ? ? ?Body Mass Index Check on??11/19/19.??Satisfied by Hong Briscoe  ??? ? ? ?Depression Screening on??11/19/19.??Satisfied by Hong Briscoe  ??? ? ? ?Diabetes Screening on??09/20/19.??Satisfied by Jacoby Russo  ??? ? ? ?Hypertension Management-Blood Pressure on??11/19/19.??Satisfied by Hong Briscoe  ??? ? ? ?Lipid Screening on??03/15/19.??Satisfied by Rachael Hendricks  ??? ? ? ?Obesity Screening on??11/19/19.??Satisfied by Hong Briscoe  ?